# Patient Record
Sex: FEMALE | URBAN - METROPOLITAN AREA
[De-identification: names, ages, dates, MRNs, and addresses within clinical notes are randomized per-mention and may not be internally consistent; named-entity substitution may affect disease eponyms.]

---

## 2023-05-31 ENCOUNTER — APPOINTMENT (RX ONLY)
Dept: URBAN - METROPOLITAN AREA CLINIC 158 | Facility: CLINIC | Age: 63
Setting detail: DERMATOLOGY
End: 2023-05-31

## 2023-05-31 DIAGNOSIS — L72.11 PILAR CYST: ICD-10-CM

## 2023-05-31 PROCEDURE — ? DEFER

## 2023-05-31 PROCEDURE — 99202 OFFICE O/P NEW SF 15 MIN: CPT

## 2023-05-31 PROCEDURE — ? COUNSELING

## 2023-05-31 ASSESSMENT — LOCATION SIMPLE DESCRIPTION DERM: LOCATION SIMPLE: SCALP

## 2023-05-31 ASSESSMENT — LOCATION ZONE DERM: LOCATION ZONE: SCALP

## 2023-05-31 ASSESSMENT — LOCATION DETAILED DESCRIPTION DERM: LOCATION DETAILED: RIGHT INFERIOR FRONTAL SCALP

## 2023-05-31 NOTE — PROCEDURE: DEFER
Procedure To Be Performed At Next Visit: Excision
Introduction Text (Please End With A Colon): The following procedure was deferred: pt will follow up in 2 weeks.
X Size Of Lesion In Cm (Optional): 0
Detail Level: Zone

## 2023-06-07 ENCOUNTER — APPOINTMENT (RX ONLY)
Dept: URBAN - METROPOLITAN AREA CLINIC 158 | Facility: CLINIC | Age: 63
Setting detail: DERMATOLOGY
End: 2023-06-07

## 2023-06-07 DIAGNOSIS — L72.11 PILAR CYST: ICD-10-CM

## 2023-06-07 PROCEDURE — 11423 EXC H-F-NK-SP B9+MARG 2.1-3: CPT

## 2023-06-07 PROCEDURE — 12031 INTMD RPR S/A/T/EXT 2.5 CM/<: CPT

## 2023-06-07 PROCEDURE — ? EXCISION

## 2023-06-07 ASSESSMENT — LOCATION ZONE DERM: LOCATION ZONE: SCALP

## 2023-06-07 ASSESSMENT — LOCATION SIMPLE DESCRIPTION DERM: LOCATION SIMPLE: SCALP

## 2023-06-07 ASSESSMENT — LOCATION DETAILED DESCRIPTION DERM: LOCATION DETAILED: RIGHT INFERIOR FRONTAL SCALP

## 2023-06-07 NOTE — PROCEDURE: EXCISION
